# Patient Record
Sex: FEMALE | Race: WHITE
[De-identification: names, ages, dates, MRNs, and addresses within clinical notes are randomized per-mention and may not be internally consistent; named-entity substitution may affect disease eponyms.]

---

## 2017-09-03 ENCOUNTER — HOSPITAL ENCOUNTER (EMERGENCY)
Dept: HOSPITAL 54 - ER | Age: 27
Discharge: HOME | End: 2017-09-03
Payer: MEDICAID

## 2017-09-03 VITALS — SYSTOLIC BLOOD PRESSURE: 132 MMHG | DIASTOLIC BLOOD PRESSURE: 68 MMHG

## 2017-09-03 VITALS — WEIGHT: 115 LBS | BODY MASS INDEX: 20.38 KG/M2 | HEIGHT: 63 IN
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
## 2017-09-03 DIAGNOSIS — S00.83XA: Primary | ICD-10-CM
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
## 2017-09-03 PROCEDURE — 70140 X-RAY EXAM OF FACIAL BONES: CPT

## 2017-09-03 PROCEDURE — Z7610: HCPCS

## 2017-09-03 PROCEDURE — 99284 EMERGENCY DEPT VISIT MOD MDM: CPT

## 2017-09-03 PROCEDURE — A4606 OXYGEN PROBE USED W OXIMETER: HCPCS
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
## 2017-09-03 NOTE — NUR
Patient discharged to home in stable condition. Written and verbal after care 
instructions given. Patient verbalizes understanding of instruction. ambulatory 
with a steady gait

## 2023-07-25 ENCOUNTER — HOSPITAL ENCOUNTER (EMERGENCY)
Dept: HOSPITAL 54 - ER | Age: 33
Discharge: HOME | End: 2023-07-25
Payer: MEDICAID

## 2023-07-25 VITALS — TEMPERATURE: 98.2 F | DIASTOLIC BLOOD PRESSURE: 90 MMHG | SYSTOLIC BLOOD PRESSURE: 133 MMHG | OXYGEN SATURATION: 97 %

## 2023-07-25 VITALS — HEIGHT: 65 IN | WEIGHT: 115 LBS | BODY MASS INDEX: 19.16 KG/M2

## 2023-07-25 DIAGNOSIS — Z98.890: ICD-10-CM

## 2023-07-25 DIAGNOSIS — H57.89: Primary | ICD-10-CM

## 2023-07-25 DIAGNOSIS — M79.601: ICD-10-CM

## 2023-07-25 DIAGNOSIS — Z60.2: ICD-10-CM

## 2023-07-25 DIAGNOSIS — T63.441A: ICD-10-CM
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
## 2023-07-25 PROCEDURE — 99284 EMERGENCY DEPT VISIT MOD MDM: CPT

## 2023-07-25 RX ADMIN — LORATADINE SCH MG: 10 TABLET ORAL at 03:31

## 2023-07-25 RX ADMIN — LORATADINE SCH MG: 10 TABLET ORAL at 03:30

## 2023-07-25 SDOH — SOCIAL STABILITY - SOCIAL INSECURITY: PROBLEMS RELATED TO LIVING ALONE: Z60.2

## 2023-07-25 NOTE — NUR
BIBSELF FROM HOME C/O ALLERGIC REACTION FROM BEE STING TO R INDEX FINGER, 
SWELLING AND REDNESS NOTED TO AFFECTED AREA & SPREADING UP ARM. PT AAOX4, MOVES 
ALL EXTREMITIES, AMBULATORY. PLACED IN BED, AWAITING MD ROA.

## 2024-08-23 ENCOUNTER — HOSPITAL ENCOUNTER (EMERGENCY)
Dept: HOSPITAL 54 - ER | Age: 34
Discharge: HOME | End: 2024-08-23
Payer: SELF-PAY

## 2024-08-23 VITALS — BODY MASS INDEX: 20.49 KG/M2 | HEIGHT: 64 IN | WEIGHT: 120 LBS

## 2024-08-23 VITALS — SYSTOLIC BLOOD PRESSURE: 134 MMHG | OXYGEN SATURATION: 98 % | DIASTOLIC BLOOD PRESSURE: 89 MMHG | TEMPERATURE: 98.6 F

## 2024-08-23 DIAGNOSIS — Z79.51: ICD-10-CM

## 2024-08-23 DIAGNOSIS — Z79.899: ICD-10-CM

## 2024-08-23 DIAGNOSIS — F41.9: Primary | ICD-10-CM

## 2024-08-23 DIAGNOSIS — J45.909: ICD-10-CM

## 2024-08-23 DIAGNOSIS — Z71.1: ICD-10-CM

## 2024-08-23 DIAGNOSIS — Z60.2: ICD-10-CM

## 2024-08-23 SDOH — SOCIAL STABILITY - SOCIAL INSECURITY: PROBLEMS RELATED TO LIVING ALONE: Z60.2
